# Patient Record
Sex: MALE | Race: WHITE | NOT HISPANIC OR LATINO | Employment: OTHER | URBAN - METROPOLITAN AREA
[De-identification: names, ages, dates, MRNs, and addresses within clinical notes are randomized per-mention and may not be internally consistent; named-entity substitution may affect disease eponyms.]

---

## 2023-01-23 NOTE — PATIENT DISCUSSION
[facility] EMERGENCY DEPT  [address] [place] [address] [place] [id number]  [phone]    Work/School Note    Date: 1/23/2023    To Whom It May concern:    Graeme Beltre was seen and treated today in the emergency room by the following provider(s):  Attending Provider: Anshu Su MD.      Graeme Beltre is excused from work/school on 01/23/23 and 01/24/23. She is medically clear to return to work/school on 1/25/2023.        Sincerely,          Fatuma Cruz MD EYE OS, IOL TYPE Monofocal, POST OPERATIVE TARGET [employer], PACKAGE Basic.

## 2023-04-13 ENCOUNTER — POST-OP (OUTPATIENT)
Dept: URBAN - METROPOLITAN AREA CLINIC 39 | Facility: CLINIC | Age: 83
End: 2023-04-13

## 2023-04-13 DIAGNOSIS — Z96.1: ICD-10-CM

## 2023-04-13 DIAGNOSIS — H25.811: ICD-10-CM

## 2023-04-13 PROCEDURE — 99024 POSTOP FOLLOW-UP VISIT: CPT

## 2023-04-13 PROCEDURE — 99213 OFFICE O/P EST LOW 20 MIN: CPT

## 2023-04-13 ASSESSMENT — VISUAL ACUITY
OD_PH: 20/30-1
OS_SC: J10-
OD_SC: J12
OD_SC: 20/50-1
OS_SC: 20/25+2

## 2023-04-13 ASSESSMENT — TONOMETRY
OS_IOP_MMHG: 12
OD_IOP_MMHG: 15

## 2023-05-03 ENCOUNTER — SURGERY/PROCEDURE (OUTPATIENT)
Dept: URBAN - METROPOLITAN AREA CLINIC 35 | Facility: CLINIC | Age: 83
End: 2023-05-03

## 2023-05-03 DIAGNOSIS — H25.811: ICD-10-CM

## 2023-05-03 PROCEDURE — 6698454 REMOVE CATARACT;INSERT LENS (SX ONLY)

## 2023-05-04 ENCOUNTER — POST-OP (OUTPATIENT)
Dept: URBAN - METROPOLITAN AREA CLINIC 39 | Facility: CLINIC | Age: 83
End: 2023-05-04

## 2023-05-04 DIAGNOSIS — Z96.1: ICD-10-CM

## 2023-05-04 PROCEDURE — 99024 POSTOP FOLLOW-UP VISIT: CPT

## 2023-05-04 ASSESSMENT — VISUAL ACUITY
OD_PH: 20/50-2
OS_SC: 20/30-2
OD_SC: 20/60+1

## 2023-05-04 ASSESSMENT — TONOMETRY
OD_IOP_MMHG: 16
OS_IOP_MMHG: 12

## 2023-05-23 ENCOUNTER — POST-OP (OUTPATIENT)
Dept: URBAN - METROPOLITAN AREA CLINIC 43 | Facility: CLINIC | Age: 83
End: 2023-05-23

## 2023-05-23 DIAGNOSIS — Z96.1: ICD-10-CM

## 2023-05-23 PROCEDURE — 99024 POSTOP FOLLOW-UP VISIT: CPT

## 2023-05-23 ASSESSMENT — VISUAL ACUITY
OD_SC: >J12
OS_SC: J10
OS_SC: 20/25-1
OD_SC: 20/25
OU_SC: 20/20-1

## 2023-05-23 ASSESSMENT — TONOMETRY
OD_IOP_MMHG: 13
OS_IOP_MMHG: 10

## 2024-02-29 ENCOUNTER — COMPREHENSIVE EXAM (OUTPATIENT)
Dept: URBAN - METROPOLITAN AREA CLINIC 39 | Facility: CLINIC | Age: 84
End: 2024-02-29

## 2024-02-29 DIAGNOSIS — H52.4: ICD-10-CM

## 2024-02-29 DIAGNOSIS — H52.202: ICD-10-CM

## 2024-02-29 DIAGNOSIS — H26.491: ICD-10-CM

## 2024-02-29 PROCEDURE — 92014 COMPRE OPH EXAM EST PT 1/>: CPT

## 2024-02-29 PROCEDURE — 92015 DETERMINE REFRACTIVE STATE: CPT

## 2024-02-29 ASSESSMENT — VISUAL ACUITY
OS_SC: J10
OD_SC: 20/25-2
OD_SC: J10-
OS_SC: 20/20-2

## 2024-02-29 ASSESSMENT — TONOMETRY
OD_IOP_MMHG: 13
OS_IOP_MMHG: 13